# Patient Record
Sex: FEMALE | Race: WHITE | NOT HISPANIC OR LATINO | Employment: FULL TIME | URBAN - METROPOLITAN AREA
[De-identification: names, ages, dates, MRNs, and addresses within clinical notes are randomized per-mention and may not be internally consistent; named-entity substitution may affect disease eponyms.]

---

## 2018-06-05 ENCOUNTER — APPOINTMENT (OUTPATIENT)
Dept: WOMENS IMAGING | Facility: HOSPITAL | Age: 48
End: 2018-06-05

## 2018-06-05 ENCOUNTER — OFFICE VISIT (OUTPATIENT)
Dept: OBSTETRICS AND GYNECOLOGY | Facility: CLINIC | Age: 48
End: 2018-06-05

## 2018-06-05 ENCOUNTER — PROCEDURE VISIT (OUTPATIENT)
Dept: OBSTETRICS AND GYNECOLOGY | Facility: CLINIC | Age: 48
End: 2018-06-05

## 2018-06-05 VITALS
HEIGHT: 67 IN | DIASTOLIC BLOOD PRESSURE: 76 MMHG | HEART RATE: 83 BPM | BODY MASS INDEX: 32.33 KG/M2 | WEIGHT: 206 LBS | SYSTOLIC BLOOD PRESSURE: 118 MMHG

## 2018-06-05 DIAGNOSIS — Z12.31 VISIT FOR SCREENING MAMMOGRAM: Primary | ICD-10-CM

## 2018-06-05 DIAGNOSIS — Z01.419 ENCOUNTER FOR GYNECOLOGICAL EXAMINATION WITHOUT ABNORMAL FINDING: Primary | ICD-10-CM

## 2018-06-05 PROCEDURE — 77067 SCR MAMMO BI INCL CAD: CPT | Performed by: RADIOLOGY

## 2018-06-05 PROCEDURE — 99396 PREV VISIT EST AGE 40-64: CPT | Performed by: OBSTETRICS & GYNECOLOGY

## 2018-06-05 PROCEDURE — 77067 SCR MAMMO BI INCL CAD: CPT | Performed by: OBSTETRICS & GYNECOLOGY

## 2018-06-05 NOTE — PROGRESS NOTES
"GYN Annual Exam     CC- Here for annual exam.     Amanda Conteh is a 48 y.o. female who presents for annual well woman exam. Periods are regular every 28-30 days, lasting 7 days. Dysmenorrhea:none. Cyclic symptoms include none. No intermenstrual bleeding, spotting, or discharge.    OB History      Para Term  AB Living    5       1 4    SAB TAB Ectopic Molar Multiple Live Births    1                    Current contraception: none  History of abnormal Pap smear: yes - no treatment required  Family history of uterine, colon or ovarian cancer: no  History of abnormal mammogram: no  Family history of breast cancer: no  Last Pap : 10/11/2016 NL HPV-  Last Mammo: today    Past Medical History:   Diagnosis Date   • Osteoarthritis        Past Surgical History:   Procedure Laterality Date   •  SECTION         No current outpatient prescriptions on file.    Allergies   Allergen Reactions   • Penicillins Unknown (See Comments)     As a child.        Social History   Substance Use Topics   • Smoking status: Never Smoker   • Smokeless tobacco: Never Used   • Alcohol use No       Family History   Problem Relation Age of Onset   • Coronary artery disease Father    • Stroke Father    • Diabetes Father    • Coronary artery disease Mother    • Hypertension Mother    • Breast cancer Neg Hx    • Ovarian cancer Neg Hx    • Uterine cancer Neg Hx    • Colon cancer Neg Hx    • Deep vein thrombosis Neg Hx    • Pulmonary embolism Neg Hx        Review of Systems   Constitutional: Negative for chills and fever.   Gastrointestinal: Negative for abdominal pain.   Genitourinary: Negative for dysuria, menstrual problem, pelvic pain, vaginal bleeding and vaginal discharge.   All other systems reviewed and are negative.      /76   Pulse 83   Ht 170.7 cm (67.2\")   Wt 93.4 kg (206 lb)   LMP 2018 (Exact Date)   Breastfeeding? No   BMI 32.07 kg/m²     Physical Exam   Constitutional: She is oriented to person, " place, and time. She appears well-developed and well-nourished. No distress.   HENT:   Head: Normocephalic and atraumatic.   Eyes: Conjunctivae are normal.   Neck: Normal range of motion. Neck supple. No thyromegaly present.   Cardiovascular: Normal rate and regular rhythm.    No murmur heard.  Pulmonary/Chest: Effort normal and breath sounds normal. Right breast exhibits no inverted nipple, no mass and no nipple discharge. Left breast exhibits no inverted nipple, no mass and no nipple discharge.   Abdominal: Soft. Bowel sounds are normal. She exhibits no distension. There is no tenderness.   Genitourinary: Vagina normal and uterus normal. Pelvic exam was performed with patient supine. There is no lesion on the right labia. There is no lesion on the left labia. Uterus is not enlarged (retroverted ), not fixed and not tender. Cervix exhibits no motion tenderness. Right adnexum displays no mass and no tenderness. Left adnexum displays no mass and no tenderness. No bleeding in the vagina. No vaginal discharge found.   Musculoskeletal: She exhibits no edema.   Lymphadenopathy:        Right: No inguinal adenopathy present.        Left: No inguinal adenopathy present.   Neurological: She is alert and oriented to person, place, and time.   Skin: No rash noted.   Psychiatric: She has a normal mood and affect. Her behavior is normal.          Assessment     1) GYN annual well woman exam.        Plan     1) Breast Health - Clinical breast exam yearly, Self breast awareness monthly  2) Pap - up to date   3) Smoking status- non-smoker   4) Seat belts & Sunscreen recommended  5) Follow up prn and one year.     Stuart Leslie MD   6/5/2018  10:44 AM

## 2019-12-13 ENCOUNTER — APPOINTMENT (OUTPATIENT)
Dept: WOMENS IMAGING | Facility: HOSPITAL | Age: 49
End: 2019-12-13

## 2019-12-13 ENCOUNTER — OFFICE VISIT (OUTPATIENT)
Dept: OBSTETRICS AND GYNECOLOGY | Facility: CLINIC | Age: 49
End: 2019-12-13

## 2019-12-13 ENCOUNTER — PROCEDURE VISIT (OUTPATIENT)
Dept: OBSTETRICS AND GYNECOLOGY | Facility: CLINIC | Age: 49
End: 2019-12-13

## 2019-12-13 VITALS
BODY MASS INDEX: 30.61 KG/M2 | HEART RATE: 70 BPM | DIASTOLIC BLOOD PRESSURE: 80 MMHG | SYSTOLIC BLOOD PRESSURE: 114 MMHG | WEIGHT: 195 LBS | HEIGHT: 67 IN

## 2019-12-13 DIAGNOSIS — N84.1 CERVICAL POLYP: ICD-10-CM

## 2019-12-13 DIAGNOSIS — Z12.31 VISIT FOR SCREENING MAMMOGRAM: Primary | ICD-10-CM

## 2019-12-13 DIAGNOSIS — Z01.419 ENCOUNTER FOR GYNECOLOGICAL EXAMINATION WITHOUT ABNORMAL FINDING: Primary | ICD-10-CM

## 2019-12-13 PROCEDURE — 99396 PREV VISIT EST AGE 40-64: CPT | Performed by: OBSTETRICS & GYNECOLOGY

## 2019-12-13 PROCEDURE — 77063 BREAST TOMOSYNTHESIS BI: CPT | Performed by: OBSTETRICS & GYNECOLOGY

## 2019-12-13 PROCEDURE — 77067 SCR MAMMO BI INCL CAD: CPT | Performed by: RADIOLOGY

## 2019-12-13 PROCEDURE — 77067 SCR MAMMO BI INCL CAD: CPT | Performed by: OBSTETRICS & GYNECOLOGY

## 2019-12-13 PROCEDURE — 77063 BREAST TOMOSYNTHESIS BI: CPT | Performed by: RADIOLOGY

## 2019-12-13 PROCEDURE — 57500 BIOPSY OF CERVIX: CPT | Performed by: OBSTETRICS & GYNECOLOGY

## 2019-12-13 RX ORDER — TOPIRAMATE 50 MG/1
50 TABLET, FILM COATED ORAL 2 TIMES DAILY
Refills: 0 | COMMUNITY
Start: 2019-11-05

## 2019-12-13 RX ORDER — MECLIZINE HYDROCHLORIDE 25 MG/1
25 TABLET ORAL
COMMUNITY
Start: 2018-12-27 | End: 2021-02-02

## 2019-12-13 NOTE — PROGRESS NOTES
GYN Annual Exam     CC- Here for annual exam.     Amanda Conteh is a 49 y.o. female who presents for annual well woman exam. Periods are regular every 32 days, lasting 7 days. Dysmenorrhea:none. Cyclic symptoms include none. No intermenstrual bleeding, spotting, or discharge.    OB History        5    Para        Term                AB   1    Living   4       SAB   1    TAB        Ectopic        Molar        Multiple        Live Births                    Current contraception: condoms  History of abnormal Pap smear: no  Family history of uterine, colon or ovarian cancer: no  History of abnormal mammogram: no  Family history of breast cancer: no  Last Pap : 10/11/2016 NL HPV neg  Last Mammo: today  Last Colonoscopy: never     Past Medical History:   Diagnosis Date   • Osteoarthritis        Past Surgical History:   Procedure Laterality Date   •  SECTION           Current Outpatient Medications:   •  meclizine (ANTIVERT) 25 MG tablet, Take 25 mg by mouth., Disp: , Rfl:   •  topiramate (TOPAMAX) 50 MG tablet, Take 50 mg by mouth 2 (Two) Times a Day., Disp: , Rfl: 0    Allergies   Allergen Reactions   • Penicillins Unknown (See Comments)     As a child.        Social History     Tobacco Use   • Smoking status: Never Smoker   • Smokeless tobacco: Never Used   Substance Use Topics   • Alcohol use: No   • Drug use: No       Family History   Problem Relation Age of Onset   • Coronary artery disease Father    • Stroke Father    • Diabetes Father    • Coronary artery disease Mother    • Hypertension Mother    • Breast cancer Neg Hx    • Ovarian cancer Neg Hx    • Uterine cancer Neg Hx    • Colon cancer Neg Hx    • Deep vein thrombosis Neg Hx    • Pulmonary embolism Neg Hx        Review of Systems   Constitutional: Negative for chills and fever.   Gastrointestinal: Negative for abdominal pain.   Genitourinary: Negative for dysuria, menstrual problem, pelvic pain, vaginal bleeding and vaginal discharge.  "  All other systems reviewed and are negative.      /80   Pulse 70   Ht 170.2 cm (67\")   Wt 88.5 kg (195 lb)   LMP 11/28/2019   Breastfeeding No   BMI 30.54 kg/m²     Physical Exam   Constitutional: She is oriented to person, place, and time. She appears well-developed and well-nourished. No distress. She is not obese.  HENT:   Head: Normocephalic and atraumatic.   Eyes: Conjunctivae are normal. Right eye exhibits no discharge. Left eye exhibits no discharge.   Neck: Normal range of motion. Neck supple. No thyromegaly present.   Cardiovascular: Normal rate, regular rhythm and normal heart sounds.   No murmur heard.  Pulmonary/Chest: Effort normal and breath sounds normal. No respiratory distress. Right breast exhibits no inverted nipple, no mass and no nipple discharge. Left breast exhibits no inverted nipple, no mass and no nipple discharge.   Abdominal: Soft. Bowel sounds are normal. She exhibits no distension. There is no tenderness.   Genitourinary: Vagina normal. Pelvic exam was performed with patient supine. There is no lesion or Bartholin's cyst on the right labia. There is no lesion or Bartholin's cyst on the left labia. Uterus is anteverted. Uterus is not deviated, enlarged, fixed or exhibiting a mass. Cervix exhibits polyp. Cervix does not exhibit motion tenderness or friability. Right adnexum displays no mass, no tenderness and no fullness. Left adnexum displays no mass, no tenderness and no fullness. No bleeding in the vagina. No vaginal discharge found.   Musculoskeletal: Normal range of motion. She exhibits no edema.   Lymphadenopathy:     She has no cervical adenopathy.        Right: No inguinal adenopathy present.        Left: No inguinal adenopathy present.   Neurological: She is alert and oriented to person, place, and time.   Skin: Skin is warm and dry. No rash noted.   Psychiatric: She has a normal mood and affect. Her behavior is normal. Judgment and thought content normal.          "   Assessment     1) GYN annual well woman exam.   2) Cervical polyp noted on exam   Consent obtained   Grasped with ring forceps and twisted off   Hemostatic, no apparent complication   Sent for study, permanent  Expectations and notification reviewed.   Tolerated well.      Plan     1) Breast Health - Clinical breast exam yearly, mammogram yearly, and Self breast awareness monthly  2) Pap - up to date   3) Smoking status- non-smoker   4) Activity recommends - Adult 150-300 min/week of multi-component physical activities that include balance training, aerobic and physical strengthening.  Disabled or ill adults should still try to fulfill these requirements, with modifications based on their conditions.  5) Follow up prn and one year.       Stuart Leslie MD   12/13/2019  11:07 AM

## 2019-12-19 ENCOUNTER — TELEPHONE (OUTPATIENT)
Dept: OBSTETRICS AND GYNECOLOGY | Facility: CLINIC | Age: 49
End: 2019-12-19

## 2019-12-19 LAB
DX ICD CODE: NORMAL
PATH REPORT.FINAL DX SPEC: NORMAL
PATH REPORT.GROSS SPEC: NORMAL
PATH REPORT.SITE OF ORIGIN SPEC: NORMAL
PATHOLOGIST NAME: NORMAL
PAYMENT PROCEDURE: NORMAL

## 2019-12-19 NOTE — TELEPHONE ENCOUNTER
----- Message from Symone Baird MA sent at 12/19/2019  4:22 PM EST -----  L/m for pt/praneeth  ----- Message -----  From: Stuart Leslie MD  Sent: 12/19/2019  12:19 PM EST  To: SCOTT Mcguire, let her know the polyp was not cancer. Thanks, Dr. Leslie

## 2020-12-15 ENCOUNTER — PROCEDURE VISIT (OUTPATIENT)
Dept: OBSTETRICS AND GYNECOLOGY | Facility: CLINIC | Age: 50
End: 2020-12-15

## 2020-12-15 ENCOUNTER — APPOINTMENT (OUTPATIENT)
Dept: WOMENS IMAGING | Facility: HOSPITAL | Age: 50
End: 2020-12-15

## 2020-12-15 DIAGNOSIS — Z12.31 VISIT FOR SCREENING MAMMOGRAM: Primary | ICD-10-CM

## 2020-12-15 PROCEDURE — 77067 SCR MAMMO BI INCL CAD: CPT | Performed by: OBSTETRICS & GYNECOLOGY

## 2020-12-15 PROCEDURE — 77067 SCR MAMMO BI INCL CAD: CPT | Performed by: RADIOLOGY

## 2020-12-15 PROCEDURE — 77063 BREAST TOMOSYNTHESIS BI: CPT | Performed by: OBSTETRICS & GYNECOLOGY

## 2020-12-15 PROCEDURE — 77063 BREAST TOMOSYNTHESIS BI: CPT | Performed by: RADIOLOGY

## 2021-02-02 ENCOUNTER — OFFICE VISIT (OUTPATIENT)
Dept: OBSTETRICS AND GYNECOLOGY | Facility: CLINIC | Age: 51
End: 2021-02-02

## 2021-02-02 VITALS
SYSTOLIC BLOOD PRESSURE: 133 MMHG | BODY MASS INDEX: 31.52 KG/M2 | HEART RATE: 88 BPM | DIASTOLIC BLOOD PRESSURE: 92 MMHG | WEIGHT: 208 LBS | HEIGHT: 68 IN

## 2021-02-02 DIAGNOSIS — Z01.419 ENCOUNTER FOR GYNECOLOGICAL EXAMINATION WITHOUT ABNORMAL FINDING: Primary | ICD-10-CM

## 2021-02-02 PROCEDURE — 99396 PREV VISIT EST AGE 40-64: CPT | Performed by: OBSTETRICS & GYNECOLOGY

## 2021-02-08 LAB
CYTOLOGIST CVX/VAG CYTO: NORMAL
CYTOLOGY CVX/VAG DOC CYTO: NORMAL
CYTOLOGY CVX/VAG DOC THIN PREP: NORMAL
DX ICD CODE: NORMAL
HIV 1 & 2 AB SER-IMP: NORMAL
HPV I/H RISK 4 DNA CVX QL PROBE+SIG AMP: NEGATIVE
OTHER STN SPEC: NORMAL
PATHOLOGIST CVX/VAG CYTO: NORMAL
STAT OF ADQ CVX/VAG CYTO-IMP: NORMAL

## 2022-03-01 ENCOUNTER — PROCEDURE VISIT (OUTPATIENT)
Dept: OBSTETRICS AND GYNECOLOGY | Facility: CLINIC | Age: 52
End: 2022-03-01

## 2022-03-01 ENCOUNTER — OFFICE VISIT (OUTPATIENT)
Dept: OBSTETRICS AND GYNECOLOGY | Facility: CLINIC | Age: 52
End: 2022-03-01

## 2022-03-01 ENCOUNTER — APPOINTMENT (OUTPATIENT)
Dept: WOMENS IMAGING | Facility: HOSPITAL | Age: 52
End: 2022-03-01

## 2022-03-01 VITALS
SYSTOLIC BLOOD PRESSURE: 127 MMHG | DIASTOLIC BLOOD PRESSURE: 85 MMHG | HEART RATE: 88 BPM | WEIGHT: 205 LBS | BODY MASS INDEX: 31.07 KG/M2 | HEIGHT: 68 IN

## 2022-03-01 DIAGNOSIS — Z12.31 VISIT FOR SCREENING MAMMOGRAM: Primary | ICD-10-CM

## 2022-03-01 DIAGNOSIS — Z01.419 ENCOUNTER FOR GYNECOLOGICAL EXAMINATION WITHOUT ABNORMAL FINDING: Primary | ICD-10-CM

## 2022-03-01 PROCEDURE — 77063 BREAST TOMOSYNTHESIS BI: CPT | Performed by: RADIOLOGY

## 2022-03-01 PROCEDURE — 99396 PREV VISIT EST AGE 40-64: CPT | Performed by: OBSTETRICS & GYNECOLOGY

## 2022-03-01 PROCEDURE — 77067 SCR MAMMO BI INCL CAD: CPT | Performed by: OBSTETRICS & GYNECOLOGY

## 2022-03-01 PROCEDURE — 77067 SCR MAMMO BI INCL CAD: CPT | Performed by: RADIOLOGY

## 2022-03-01 PROCEDURE — 77063 BREAST TOMOSYNTHESIS BI: CPT | Performed by: OBSTETRICS & GYNECOLOGY

## 2022-03-01 RX ORDER — PHENTERMINE HYDROCHLORIDE 15 MG/1
CAPSULE ORAL
COMMUNITY
Start: 2022-02-10

## 2022-03-01 RX ORDER — ERGOCALCIFEROL 1.25 MG/1
50000 CAPSULE ORAL WEEKLY
COMMUNITY
Start: 2022-02-10

## 2022-03-01 NOTE — PROGRESS NOTES
GYN Annual Exam     CC- Here for annual exam.     Amanda Conteh is a 52 y.o. female who presents for annual well woman exam. Periods are regular every 28-30 days, lasting 7 days. Dysmenorrhea:none. Cyclic symptoms include none. No intermenstrual bleeding, spotting, or discharge.    OB History        5    Para        Term                AB   1    Living   4       SAB   1    IAB        Ectopic        Molar        Multiple        Live Births                    Current contraception: condoms  History of abnormal Pap smear: no  Family history of uterine, colon or ovarian cancer: no  History of abnormal mammogram: no  Family history of breast cancer: no  Last Pap : 2021 NL HPV neg  Last mammogram: today  Last colonoscopy: Cologard last year  Last DEXA: Never - still menstrual   Parental Hip Fracture: No    Past Medical History:   Diagnosis Date   • Anemia    • Hyperlipidemia    • Osteoarthritis        Past Surgical History:   Procedure Laterality Date   •  SECTION           Current Outpatient Medications:   •  phentermine 15 MG capsule, , Disp: , Rfl:   •  topiramate (TOPAMAX) 50 MG tablet, Take 50 mg by mouth 2 (Two) Times a Day., Disp: , Rfl: 0  •  vitamin D (ERGOCALCIFEROL) 1.25 MG (44548 UT) capsule capsule, Take 50,000 Units by mouth 1 (One) Time Per Week., Disp: , Rfl:     Allergies   Allergen Reactions   • Penicillins Unknown (See Comments)     As a child.   Category: Allergy;        Social History     Tobacco Use   • Smoking status: Never Smoker   • Smokeless tobacco: Never Used   Substance Use Topics   • Alcohol use: No   • Drug use: No       Family History   Problem Relation Age of Onset   • Coronary artery disease Father    • Stroke Father    • Diabetes Father    • Coronary artery disease Mother    • Hypertension Mother    • Breast cancer Neg Hx    • Ovarian cancer Neg Hx    • Uterine cancer Neg Hx    • Colon cancer Neg Hx    • Deep vein thrombosis Neg Hx    • Pulmonary embolism  "Neg Hx        Review of Systems   Constitutional: Negative for chills and fever.   Gastrointestinal: Negative for abdominal pain, constipation and diarrhea.   Genitourinary: Negative for menstrual problem, pelvic pain, vaginal bleeding and vaginal discharge.   All other systems reviewed and are negative.      /85   Pulse 88   Ht 172.7 cm (68\")   Wt 93 kg (205 lb)   LMP 02/13/2022   Breastfeeding No   BMI 31.17 kg/m²     Physical Exam  Constitutional:       General: She is not in acute distress.     Appearance: She is well-developed. She is obese.   Genitourinary:      Vulva and rectum normal.      Right Labia: No lesions or Bartholin's cyst.     Left Labia: No lesions or Bartholin's cyst.     No inguinal adenopathy present in the right or left side.     No vaginal discharge or bleeding.        Right Adnexa: not tender, not full and no mass present.     Left Adnexa: not tender, not full and no mass present.     No cervical motion tenderness or friability.      Uterus is not enlarged or tender.      No uterine mass detected.     Uterus is anteverted.   Rectum:      No rectal mass or abnormal anal tone.   Breasts:      Right: No inverted nipple, mass or nipple discharge.      Left: No inverted nipple, mass or nipple discharge.       HENT:      Head: Normocephalic and atraumatic.      Nose: Nose normal.   Eyes:      Conjunctiva/sclera: Conjunctivae normal.      Pupils: Pupils are equal, round, and reactive to light.   Neck:      Thyroid: No thyromegaly.   Cardiovascular:      Rate and Rhythm: Normal rate and regular rhythm.      Heart sounds: Normal heart sounds. No murmur heard.      Pulmonary:      Effort: Pulmonary effort is normal. No respiratory distress.      Breath sounds: Normal breath sounds.   Abdominal:      General: Abdomen is flat. There is no distension.      Palpations: Abdomen is soft.      Tenderness: There is no abdominal tenderness.   Musculoskeletal:         General: No deformity. Normal " range of motion.      Cervical back: Normal range of motion and neck supple.      Right lower leg: No edema.      Left lower leg: No edema.   Lymphadenopathy:      Lower Body: No right inguinal adenopathy. No left inguinal adenopathy.   Neurological:      Mental Status: She is alert and oriented to person, place, and time.   Skin:     General: Skin is warm and dry.      Findings: No erythema.   Psychiatric:         Behavior: Behavior normal.         Thought Content: Thought content normal.         Judgment: Judgment normal.   Vitals reviewed. Exam conducted with a chaperone present.             Assessment     Diagnoses and all orders for this visit:    1. Encounter for gynecological examination without abnormal finding (Primary)    Reviewed   Doing well.      Plan     1) Breast Health - Clinical breast exam & mammogram reviewed specifically American [employer] Society recommendations for screening specific to her, and Self breast awareness monthly  2) Pap - Up to date   3) Smoking status- non-smoker   4) Colon health - screening Cologaurd up to date  5) Bone health - Weight bearing exercise, dietary calcium recommendations and vitamin D reviewed.   6) Encouraged to be wary of information obtained via social media and internet based on source and search.   7) Follow up prn and one year      Stuart Leslie MD   3/1/2022  10:58 EST

## 2022-03-06 NOTE — PROGRESS NOTES
Discharge instructions and prescriptions reviewed with pt. Pt verbalized understanding. Pt ambulated out in stable condition. Assessment unchanged upon discharge.      Vipin Guzman RN  12/18/21 2361 GYN Annual Exam     CC- Here for annual exam.     Amanda Conteh is a 51 y.o. female who presents for annual well woman exam. Periods are regular every 28-30 days, lasting 7 days. Dysmenorrhea:none. Cyclic symptoms include none. No intermenstrual bleeding, spotting, or discharge.    OB History        5    Para        Term                AB   1    Living   4       SAB   1    TAB        Ectopic        Molar        Multiple        Live Births                    Current contraception: condoms  History of abnormal Pap smear: no  Family history of uterine, colon or ovarian cancer: no  History of abnormal mammogram: no  Family history of breast cancer: no  Last Pap : 10/11/2016 NL HPV neg  Last mammogram: 12/15/2020  Last colonoscopy: Never-considering Cologard  Last DEXA: never  Parental Hip Fracture: No    Past Medical History:   Diagnosis Date   • Anemia    • Hyperlipidemia    • Osteoarthritis        Past Surgical History:   Procedure Laterality Date   •  SECTION           Current Outpatient Medications:   •  topiramate (TOPAMAX) 50 MG tablet, Take 50 mg by mouth 2 (Two) Times a Day., Disp: , Rfl: 0    Allergies   Allergen Reactions   • Penicillins Unknown (See Comments)     As a child.        Social History     Tobacco Use   • Smoking status: Never Smoker   • Smokeless tobacco: Never Used   Substance Use Topics   • Alcohol use: No   • Drug use: No       Family History   Problem Relation Age of Onset   • Coronary artery disease Father    • Stroke Father    • Diabetes Father    • Coronary artery disease Mother    • Hypertension Mother    • Breast cancer Neg Hx    • Ovarian cancer Neg Hx    • Uterine cancer Neg Hx    • Colon cancer Neg Hx    • Deep vein thrombosis Neg Hx    • Pulmonary embolism Neg Hx        Review of Systems   Constitutional: Negative for chills and fever.   Gastrointestinal: Negative for abdominal pain, constipation and diarrhea.   Genitourinary: Negative for pelvic pain,  "vaginal bleeding and vaginal discharge.   All other systems reviewed and are negative.      /92   Pulse 88   Ht 172.7 cm (68\")   Wt 94.3 kg (208 lb)   LMP 01/16/2021 (Exact Date)   BMI 31.63 kg/m²     Physical Exam  Constitutional:       General: She is not in acute distress.     Appearance: She is well-developed and normal weight.   Genitourinary:      Pelvic exam was performed with patient supine.      Vulva, vagina, uterus, right adnexa, left adnexa and rectum normal.      No vulval lesion or Bartholin's cyst noted.      No inguinal adenopathy present in the right or left side.     No vaginal discharge or bleeding.      No cervical motion tenderness or friability.      Uterus is not enlarged or tender.      No uterine mass detected.     Uterus is anteverted and regular.      No right or left adnexal mass present.      Right adnexa not tender or full.      Left adnexa not tender or full.   Rectum:      No rectal mass or abnormal anal tone.   HENT:      Head: Normocephalic and atraumatic.      Nose: Nose normal.   Eyes:      Conjunctiva/sclera: Conjunctivae normal.      Pupils: Pupils are equal, round, and reactive to light.   Neck:      Musculoskeletal: Normal range of motion and neck supple.      Thyroid: No thyromegaly.   Cardiovascular:      Rate and Rhythm: Normal rate and regular rhythm.      Heart sounds: Normal heart sounds. No murmur.   Pulmonary:      Effort: Pulmonary effort is normal. No respiratory distress.      Breath sounds: Normal breath sounds.   Chest:      Breasts:         Right: No inverted nipple, mass or nipple discharge.         Left: No inverted nipple, mass or nipple discharge.   Abdominal:      General: Abdomen is flat. There is no distension.      Palpations: Abdomen is soft.      Tenderness: There is no abdominal tenderness.   Musculoskeletal: Normal range of motion.         General: No deformity.      Right lower leg: No edema.      Left lower leg: No edema. "   Lymphadenopathy:      Lower Body: No right inguinal adenopathy. No left inguinal adenopathy.   Neurological:      Mental Status: She is alert and oriented to person, place, and time.   Skin:     General: Skin is warm and dry.      Findings: No erythema.   Psychiatric:         Behavior: Behavior normal.         Thought Content: Thought content normal.         Judgment: Judgment normal.   Vitals signs reviewed. Exam conducted with a chaperone present.             Assessment     1) GYN annual well woman exam.        Plan     1) Breast Health - Clinical breast exam & mammogram reviewed specifically [employer] recommendations for screening specific to her, and Self breast awareness monthly  2) Pap - updated today   3) Smoking status- non-smoker   4) Colon health - screening colonoscopy recommended   5) Bone health - Weight bearing exercise, dietary calcium recommendations and vitamin D reviewed.   6) Encouraged to be wary of information obtained via social media and internet based on source and search.   7) Follow up prn and one year      Stuart Leslie MD   2/2/2021  10:23 EST   AMS, diarrhea

## 2023-04-11 ENCOUNTER — PROCEDURE VISIT (OUTPATIENT)
Dept: OBSTETRICS AND GYNECOLOGY | Facility: CLINIC | Age: 53
End: 2023-04-11
Payer: COMMERCIAL

## 2023-04-11 ENCOUNTER — OFFICE VISIT (OUTPATIENT)
Dept: OBSTETRICS AND GYNECOLOGY | Facility: CLINIC | Age: 53
End: 2023-04-11
Payer: COMMERCIAL

## 2023-04-11 VITALS
WEIGHT: 199 LBS | BODY MASS INDEX: 30.16 KG/M2 | DIASTOLIC BLOOD PRESSURE: 82 MMHG | HEART RATE: 89 BPM | HEIGHT: 68 IN | SYSTOLIC BLOOD PRESSURE: 110 MMHG

## 2023-04-11 DIAGNOSIS — Z12.31 VISIT FOR SCREENING MAMMOGRAM: Primary | ICD-10-CM

## 2023-04-11 DIAGNOSIS — Z01.419 ENCOUNTER FOR GYNECOLOGICAL EXAMINATION WITHOUT ABNORMAL FINDING: Primary | ICD-10-CM

## 2023-04-11 PROCEDURE — 77063 BREAST TOMOSYNTHESIS BI: CPT | Performed by: OBSTETRICS & GYNECOLOGY

## 2023-04-11 PROCEDURE — 77067 SCR MAMMO BI INCL CAD: CPT | Performed by: OBSTETRICS & GYNECOLOGY

## 2023-04-11 RX ORDER — TOPIRAMATE 50 MG/1
1 TABLET, FILM COATED ORAL 2 TIMES DAILY
COMMUNITY
Start: 2023-04-04

## 2023-04-11 RX ORDER — PHENTERMINE HYDROCHLORIDE 30 MG/1
CAPSULE ORAL
COMMUNITY
Start: 2023-04-04

## 2023-04-11 NOTE — PROGRESS NOTES
GYN Annual Exam     CC- Here for annual exam.     Amanda Conteh is a 53 y.o. female who presents for annual well woman exam. Periods are irregular. LNMP 2022, did not have a period until 2023 and 03/2023 x 7 days.     OB History        5    Para        Term                AB   1    Living   4       SAB   1    IAB        Ectopic        Molar        Multiple        Live Births                    Current contraception: none  History of abnormal Pap smear: no  Family history of uterine, colon or ovarian cancer: no  History of abnormal mammogram: no  Family history of breast cancer: no  Last Pap : 2021 NIL HPV neg  Last mammogram: today  Last colon screen:  Cologaurd - 1 yr ago   Last DEXA: Never - FRAX 4.3% and 0.2%   Parental Hip Fracture: No     Past Medical History:   Diagnosis Date   • Anemia    • Hyperlipidemia    • Osteoarthritis        Past Surgical History:   Procedure Laterality Date   •  SECTION           Current Outpatient Medications:   •  topiramate (TOPAMAX) 50 MG tablet, Take 1 tablet by mouth 2 (Two) Times a Day., Disp: , Rfl:   •  phentermine 30 MG capsule, , Disp: , Rfl:   •  vitamin D (ERGOCALCIFEROL) 1.25 MG (09721 UT) capsule capsule, Take 50,000 Units by mouth 1 (One) Time Per Week., Disp: , Rfl:     Allergies   Allergen Reactions   • Penicillins Unknown (See Comments)     As a child.   Category: Allergy;        Social History     Tobacco Use   • Smoking status: Never   • Smokeless tobacco: Never   Substance Use Topics   • Alcohol use: No   • Drug use: No       Family History   Problem Relation Age of Onset   • Coronary artery disease Father    • Stroke Father    • Diabetes Father    • Coronary artery disease Mother    • Hypertension Mother    • Breast cancer Neg Hx    • Ovarian cancer Neg Hx    • Uterine cancer Neg Hx    • Colon cancer Neg Hx    • Deep vein thrombosis Neg Hx    • Pulmonary embolism Neg Hx        Review of Systems   Constitutional: Negative for  "chills and fever.   Gastrointestinal: Negative for abdominal pain, constipation and diarrhea.   Genitourinary: Negative for pelvic pain, vaginal bleeding and vaginal discharge.   All other systems reviewed and are negative.      /82   Pulse 89   Ht 172.7 cm (68\")   Wt 90.3 kg (199 lb)   LMP  (Approximate) Comment: 03/2023  BMI 30.26 kg/m²     Physical Exam  Constitutional:       General: She is not in acute distress.     Appearance: She is well-developed. She is obese.   Genitourinary:      Vulva, bladder, rectum and urethral meatus normal.      Right Labia: No lesions or Bartholin's cyst.     Left Labia: No lesions or Bartholin's cyst.     No inguinal adenopathy present in the right or left side.     No vaginal discharge or bleeding.      No vaginal prolapse present.     Mild vaginal atrophy present.       Right Adnexa: not tender, not full and no mass present.     Left Adnexa: not tender, not full and no mass present.     No cervical motion tenderness or friability.      No parametrium thickening present.     Uterus is not enlarged or tender.      No uterine mass detected.  Rectum:      No rectal mass or abnormal anal tone.   Breasts:     Right: No inverted nipple, mass or nipple discharge.      Left: No inverted nipple, mass or nipple discharge.   HENT:      Head: Normocephalic and atraumatic.   Eyes:      Conjunctiva/sclera: Conjunctivae normal.      Pupils: Pupils are equal, round, and reactive to light.   Neck:      Thyroid: No thyromegaly.   Cardiovascular:      Rate and Rhythm: Normal rate and regular rhythm.      Heart sounds: Normal heart sounds. No murmur heard.  Pulmonary:      Effort: Pulmonary effort is normal. No respiratory distress.      Breath sounds: Normal breath sounds.   Abdominal:      General: Abdomen is flat. There is no distension.      Palpations: Abdomen is soft.      Tenderness: There is no abdominal tenderness.   Musculoskeletal:         General: No deformity. Normal range of " motion.      Cervical back: Normal range of motion and neck supple.   Lymphadenopathy:      Lower Body: No right inguinal adenopathy. No left inguinal adenopathy.   Neurological:      Mental Status: She is alert and oriented to person, place, and time.   Skin:     General: Skin is warm and dry.      Findings: No erythema.   Psychiatric:         Behavior: Behavior normal.   Vitals reviewed. Exam conducted with a chaperone present.             Assessment     Diagnoses and all orders for this visit:    1. Encounter for gynecological examination without abnormal finding (Primary)    Expectations reviewed.   Bleeding pattern perimenopausal (hopefully late)      Plan     1) Breast Health - Clinical breast exam & mammogram reviewed specifically [employer] recommendations for screening specific to her, and Self breast awareness monthly  2) Pap - up to date   3) Smoking status- non-smoker   4) Colon health - screening colonoscopy up to date  5) Bone health - Weight bearing exercise, dietary calcium recommendations and vitamin D reviewed.   Low risk frax - still perimenopausal   6) Encouraged between 7-8 hours of good sleep per night.   7) Follow up prn and one year      Stuart Leslie MD   4/11/2023  15:33 EDT

## 2024-05-13 ENCOUNTER — TELEPHONE (OUTPATIENT)
Dept: OBSTETRICS AND GYNECOLOGY | Facility: CLINIC | Age: 54
End: 2024-05-13

## 2024-05-13 NOTE — TELEPHONE ENCOUNTER
Caller: Amanda Conteh    Relationship to patient: Self    Best call back number: [phone]    Patient is needing: PT NEEDED TO RESCHEDULE HER MAMMO AND ANNUAL FROM TODAY SOMETHING CAME UP AND SHE NEEDED TO LEAVE TOWN, RESCHEDULED TO 6-17

## 2024-06-17 ENCOUNTER — PROCEDURE VISIT (OUTPATIENT)
Dept: OBSTETRICS AND GYNECOLOGY | Facility: CLINIC | Age: 54
End: 2024-06-17
Payer: COMMERCIAL

## 2024-06-17 DIAGNOSIS — Z12.31 VISIT FOR SCREENING MAMMOGRAM: Primary | ICD-10-CM

## 2024-06-17 PROCEDURE — 77063 BREAST TOMOSYNTHESIS BI: CPT | Performed by: OBSTETRICS & GYNECOLOGY

## 2024-06-17 PROCEDURE — 77067 SCR MAMMO BI INCL CAD: CPT | Performed by: OBSTETRICS & GYNECOLOGY

## 2024-07-02 ENCOUNTER — OFFICE VISIT (OUTPATIENT)
Dept: OBSTETRICS AND GYNECOLOGY | Facility: CLINIC | Age: 54
End: 2024-07-02
Payer: COMMERCIAL

## 2024-07-02 VITALS
HEIGHT: 68 IN | HEART RATE: 73 BPM | DIASTOLIC BLOOD PRESSURE: 90 MMHG | WEIGHT: 209 LBS | BODY MASS INDEX: 31.67 KG/M2 | SYSTOLIC BLOOD PRESSURE: 129 MMHG

## 2024-07-02 DIAGNOSIS — Z01.419 ENCOUNTER FOR GYNECOLOGICAL EXAMINATION WITHOUT ABNORMAL FINDING: Primary | ICD-10-CM

## 2024-07-02 RX ORDER — TOPIRAMATE 100 MG/1
100 TABLET, FILM COATED ORAL
COMMUNITY
Start: 2024-06-10 | End: 2024-12-07

## 2024-07-02 NOTE — PROGRESS NOTES
GYN Annual Exam     CC- Here for annual exam.     Amanda Conteh is a 54 y.o. female who presents for annual well woman exam. Periods are rare, lasting 7 days, very light. Dysmenorrhea:none. Cyclic symptoms include none. No intermenstrual bleeding, spotting, or discharge.    OB History          5    Para        Term                AB   1    Living   4         SAB   1    IAB        Ectopic        Molar        Multiple        Live Births                    Current contraception: tubal ligation  History of abnormal Pap smear: no  Family history of uterine, colon or ovarian cancer: no  History of abnormal mammogram: no  Family history of breast cancer: no  Last Pap : 2021 NIL HPV neg  Last mammogram: 2024  Last colonoscopy: Cologard 2023  Last DEXA: Never   Parental Hip Fracture: No     Past Medical History:   Diagnosis Date    Anemia     Hyperlipidemia     Osteoarthritis        Past Surgical History:   Procedure Laterality Date     SECTION           Current Outpatient Medications:     topiramate (TOPAMAX) 100 MG tablet, Take 1 tablet by mouth., Disp: , Rfl:     phentermine 30 MG capsule, , Disp: , Rfl:     vitamin D (ERGOCALCIFEROL) 1.25 MG (27793 UT) capsule capsule, Take 50,000 Units by mouth 1 (One) Time Per Week., Disp: , Rfl:     Allergies   Allergen Reactions    Egg Shells Other (See Comments)     Skin reaction    Penicillins Unknown (See Comments)     As a child.   Category: Allergy;        Social History     Tobacco Use    Smoking status: Never    Smokeless tobacco: Never   Substance Use Topics    Alcohol use: No    Drug use: No       Family History   Problem Relation Age of Onset    Coronary artery disease Father     Stroke Father     Diabetes Father     Coronary artery disease Mother     Hypertension Mother     Breast cancer Neg Hx     Ovarian cancer Neg Hx     Uterine cancer Neg Hx     Colon cancer Neg Hx     Deep vein thrombosis Neg Hx     Pulmonary embolism Neg Hx  "       Review of Systems   Constitutional:  Negative for chills and fever.   Gastrointestinal:  Negative for abdominal pain, constipation and diarrhea.   Genitourinary:  Negative for menstrual problem, pelvic pain, vaginal bleeding and vaginal discharge.   All other systems reviewed and are negative.      /90   Pulse 73   Ht 172.7 cm (68\")   Wt 94.8 kg (209 lb)   LMP 06/17/2024 (Exact Date)   BMI 31.78 kg/m²     Physical Exam  Constitutional:       General: She is not in acute distress.     Appearance: She is well-developed and normal weight.   Genitourinary:      Vulva, bladder, rectum and urethral meatus normal.      Right Labia: No lesions or Bartholin's cyst.     Left Labia: No lesions or Bartholin's cyst.     No vaginal discharge or bleeding.      No vaginal prolapse present.     Mild vaginal atrophy present.       Right Adnexa: not tender, not full and no mass present.     Left Adnexa: not tender, not full and no mass present.     No cervical motion tenderness or friability.      No parametrium thickening present.     Uterus is not enlarged or tender.      No uterine mass detected.  Rectum:      No rectal mass or abnormal anal tone.   Breasts:     Right: No inverted nipple, mass or nipple discharge.      Left: No inverted nipple, mass or nipple discharge.   HENT:      Head: Normocephalic and atraumatic.   Eyes:      Conjunctiva/sclera: Conjunctivae normal.      Pupils: Pupils are equal, round, and reactive to light.   Neck:      Thyroid: No thyromegaly.   Cardiovascular:      Rate and Rhythm: Normal rate and regular rhythm.      Heart sounds: Normal heart sounds. No murmur heard.  Pulmonary:      Effort: Pulmonary effort is normal. No respiratory distress.      Breath sounds: Normal breath sounds.   Abdominal:      General: Abdomen is flat. There is no distension.      Palpations: Abdomen is soft.      Tenderness: There is no abdominal tenderness.   Musculoskeletal:         General: No deformity. " Normal range of motion.      Cervical back: Normal range of motion and neck supple.   Lymphadenopathy:      Lower Body: No right inguinal adenopathy. No left inguinal adenopathy.   Neurological:      Mental Status: She is alert and oriented to person, place, and time.      Deep Tendon Reflexes: Reflexes normal.   Skin:     General: Skin is warm and dry.      Findings: No erythema.   Psychiatric:         Behavior: Behavior normal.   Vitals reviewed. Exam conducted with a chaperone present.             Assessment     Diagnoses and all orders for this visit:    1. Encounter for gynecological examination without abnormal finding (Primary)  -     IGP, Apt HPV,rfx 16 / 18,45    Other orders  -     Cancel: US Non-ob Transvaginal    1) GYN exam   BP okay  See below   Doing well     Discussed still pre to milagros-menopausal   Discussed GYN ultrasound to look at EL   But still reasonable age for this   If having worsening or persists well beyond 55 years of age      Plan     1) Breast Health - Clinical breast exam & mammogram reviewed specifically [employer] recommendations for screening specific to her, and Self breast awareness monthly  CBE normal, MMG up to date   2) Pap - updated today, expectations reviewed   3) Smoking status- non-smoker   4) Colon health - screening up to date  5) Bone health - Weight bearing exercise, dietary calcium recommendations and vitamin D reviewed.   Still pre-menopausal   6) Encouraged between 7-8 hours of good sleep per night.   7) Follow up prn and one year      Stuart Leslie MD   7/2/2024  15:16 EDT

## 2024-07-05 LAB
CYTOLOGIST CVX/VAG CYTO: NORMAL
CYTOLOGY CVX/VAG DOC CYTO: NORMAL
CYTOLOGY CVX/VAG DOC THIN PREP: NORMAL
DX ICD CODE: NORMAL
HPV I/H RISK 4 DNA CVX QL PROBE+SIG AMP: NEGATIVE
Lab: NORMAL
OTHER STN SPEC: NORMAL
STAT OF ADQ CVX/VAG CYTO-IMP: NORMAL